# Patient Record
Sex: MALE | Race: WHITE | URBAN - NONMETROPOLITAN AREA
[De-identification: names, ages, dates, MRNs, and addresses within clinical notes are randomized per-mention and may not be internally consistent; named-entity substitution may affect disease eponyms.]

---

## 2020-11-23 ENCOUNTER — NURSE ONLY (OUTPATIENT)
Dept: PRIMARY CARE CLINIC | Age: 40
End: 2020-11-23

## 2020-11-23 DIAGNOSIS — Z20.822 ENCOUNTER FOR LABORATORY TESTING FOR COVID-19 VIRUS: ICD-10-CM

## 2020-11-25 LAB
SARS-COV-2: NOT DETECTED
SOURCE: NORMAL

## 2022-01-05 ENCOUNTER — OFFICE VISIT (OUTPATIENT)
Dept: FAMILY MEDICINE CLINIC | Age: 42
End: 2022-01-05
Payer: COMMERCIAL

## 2022-01-05 VITALS
OXYGEN SATURATION: 95 % | TEMPERATURE: 97.6 F | DIASTOLIC BLOOD PRESSURE: 74 MMHG | HEIGHT: 67 IN | WEIGHT: 194 LBS | HEART RATE: 103 BPM | BODY MASS INDEX: 30.45 KG/M2 | SYSTOLIC BLOOD PRESSURE: 110 MMHG

## 2022-01-05 DIAGNOSIS — R07.0 THROAT PAIN: Primary | ICD-10-CM

## 2022-01-05 LAB
Lab: NORMAL
PERFORMING INSTRUMENT: NORMAL
QC PASS/FAIL: NORMAL
S PYO AG THROAT QL: NORMAL
SARS-COV-2, POC: NORMAL

## 2022-01-05 PROCEDURE — 87880 STREP A ASSAY W/OPTIC: CPT | Performed by: FAMILY MEDICINE

## 2022-01-05 PROCEDURE — 99213 OFFICE O/P EST LOW 20 MIN: CPT | Performed by: FAMILY MEDICINE

## 2022-01-05 PROCEDURE — 87426 SARSCOV CORONAVIRUS AG IA: CPT | Performed by: FAMILY MEDICINE

## 2022-01-05 RX ORDER — AMOXICILLIN 500 MG/1
500 CAPSULE ORAL 2 TIMES DAILY
Qty: 20 CAPSULE | Refills: 0 | Status: SHIPPED | OUTPATIENT
Start: 2022-01-05 | End: 2022-01-15

## 2022-01-05 RX ORDER — LISINOPRIL 5 MG/1
TABLET ORAL
COMMUNITY

## 2022-01-05 RX ORDER — LEVOTHYROXINE SODIUM 0.1 MG/1
TABLET ORAL
COMMUNITY
Start: 2021-12-28

## 2022-01-05 NOTE — PROGRESS NOTES
Mya Leonard In    [facility] presents to the office today for   Chief Complaint   Patient presents with    Pharyngitis    Fever    Congestion    Cough     Sore throat  Started 4 days ago  Congestion  No cough  Chills  No n/v/d  No loss of taste or smell    Review of Systems     /74   Pulse 103   Temp 97.6 °F (36.4 °C) (Temporal)   Ht 5' 7\" (1.702 m)   Wt 194 lb (88 kg)   SpO2 95%   BMI 30.38 kg/m²   Physical Exam  Constitutional:       Appearance: Normal appearance. HENT:      Head: Normocephalic and atraumatic. Nose: Congestion present. Mouth/Throat:      Mouth: Mucous membranes are moist.      Pharynx: Posterior oropharyngeal erythema present. Eyes:      Extraocular Movements: Extraocular movements intact. Conjunctiva/sclera: Conjunctivae normal.   Cardiovascular:      Rate and Rhythm: Normal rate. Heart sounds: Normal heart sounds. Pulmonary:      Effort: Pulmonary effort is normal.      Breath sounds: Normal breath sounds. Skin:     General: Skin is warm. Neurological:      Mental Status: He is alert and oriented to person, place, and time. Psychiatric:         Mood and Affect: Mood normal.         Behavior: Behavior normal.            Current Outpatient Medications:     lisinopril (PRINIVIL;ZESTRIL) 5 MG tablet, Take by mouth, Disp: , Rfl:     levothyroxine (SYNTHROID) 100 MCG tablet, , Disp: , Rfl:     amoxicillin (AMOXIL) 500 MG capsule, Take 1 capsule by mouth 2 times daily for 10 days, Disp: 20 capsule, Rfl: 0     No past medical history on file. Roseann Schaffer was seen today for pharyngitis, fever, congestion and cough. Diagnoses and all orders for this visit:    Throat pain  -     POCT COVID-19, Antigen  -     POCT rapid strep A  -     amoxicillin (AMOXIL) 500 MG capsule;  Take 1 capsule by mouth 2 times daily for 10 days       Rapid testing neg  Treat as presumed strep    Cecil Reyes MD